# Patient Record
Sex: MALE | Race: WHITE | URBAN - METROPOLITAN AREA
[De-identification: names, ages, dates, MRNs, and addresses within clinical notes are randomized per-mention and may not be internally consistent; named-entity substitution may affect disease eponyms.]

---

## 2017-09-19 ENCOUNTER — HOSPITAL ENCOUNTER (EMERGENCY)
Facility: CLINIC | Age: 57
Discharge: HOME | End: 2017-09-19
Attending: EMERGENCY MEDICINE | Admitting: EMERGENCY MEDICINE
Payer: COMMERCIAL

## 2017-09-19 VITALS
OXYGEN SATURATION: 97 % | TEMPERATURE: 96.3 F | WEIGHT: 230 LBS | DIASTOLIC BLOOD PRESSURE: 77 MMHG | RESPIRATION RATE: 20 BRPM | SYSTOLIC BLOOD PRESSURE: 109 MMHG | HEIGHT: 71 IN | BODY MASS INDEX: 32.2 KG/M2 | HEART RATE: 73 BPM
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
## 2017-09-19 DIAGNOSIS — S91.311A FOOT LACERATION, RIGHT, INITIAL ENCOUNTER: ICD-10-CM

## 2017-09-19 PROCEDURE — 12042 INTMD RPR N-HF/GENIT2.6-7.5: CPT | Performed by: EMERGENCY MEDICINE

## 2017-09-19 PROCEDURE — 99284 EMERGENCY DEPT VISIT MOD MDM: CPT | Mod: Z6 | Performed by: EMERGENCY MEDICINE

## 2017-09-19 PROCEDURE — 25000125 ZZHC RX 250: Performed by: EMERGENCY MEDICINE

## 2017-09-19 PROCEDURE — 99282 EMERGENCY DEPT VISIT SF MDM: CPT | Mod: 25 | Performed by: EMERGENCY MEDICINE

## 2017-09-19 PROCEDURE — 12042 INTMD RPR N-HF/GENIT2.6-7.5: CPT | Mod: Z6 | Performed by: EMERGENCY MEDICINE

## 2017-09-19 PROCEDURE — S0020 INJECTION, BUPIVICAINE HYDRO: HCPCS | Performed by: EMERGENCY MEDICINE

## 2017-09-19 RX ORDER — BUPIVACAINE HYDROCHLORIDE 2.5 MG/ML
1 INJECTION, SOLUTION EPIDURAL; INFILTRATION; INTRACAUDAL ONCE
Status: COMPLETED | OUTPATIENT
Start: 2017-09-19 | End: 2017-09-19

## 2017-09-19 RX ORDER — CEPHALEXIN 500 MG/1
500 CAPSULE ORAL 3 TIMES DAILY
Qty: 12 CAPSULE | Refills: 0 | Status: SHIPPED | OUTPATIENT
Start: 2017-09-19 | End: 2017-09-23

## 2017-09-19 RX ADMIN — BUPIVACAINE HYDROCHLORIDE 2.5 MG: 2.5 INJECTION, SOLUTION EPIDURAL; INFILTRATION; INTRACAUDAL at 17:14

## 2017-09-19 NOTE — ED AVS SNAPSHOT
[facility] Emergency Department    [address] [place] [address]    Phone:  [phone]    Fax:  [phone]                                       Hilton Gonzales   MRN: [id number]    Department:  [facility] Emergency Department   Date of Visit:  9/19/2017           Patient Information     Date Of Birth          1960        Your diagnoses for this visit were:     Foot laceration, right, initial encounter        You were seen by Roni Carbajal MD.      Follow-up Information     Follow up with Quincy Brunner DPM.    Specialty:  Podiatry    Why:  For suture removal, For wound re-check in ~10 days    Contact information:    [facility]   [place] [address] [phone]          Discharge Instructions         Extremity Laceration: Sutures, Staples, or Tape  A laceration is a cut through the skin. If it is deep, it may require stitches (sutures) or staples to close so it can heal. Minor cuts may be treated with surgical tape closures.   X-rays may be done if something may have entered the skin through the cut. You may also need a tetanus shot if you are not up to date on this vaccination.  Home care    Follow the health care provider s instructions on how to care for the cut.    Wash your hands with soap and warm water before and after caring for your wound. This is to help prevent infection.    Keep the wound clean and dry. If a bandage was applied and it becomes wet or dirty, replace it. Otherwise, leave it in place for the first 24 hours, then change it once a day or as directed.    If sutures or staples were used, clean the wound daily:    After removing the bandage, wash the area with soap and water. Use a wet cotton swab to loosen and remove any blood or crust that forms.    After cleaning, keep the wound clean and dry. Talk with your doctor before applying any antibiotic ointment to the wound. Reapply the bandage.    You may remove the bandage to shower as usual after  the first 24 hours, but do not soak the area in water (no swimming) until the stitches or staples are removed.    If surgical tape closures were used, keep the area clean and dry. If it becomes wet, blot it dry with a towel.    The doctor may prescribe an antibiotic cream or ointment to prevent infection. Do not stop taking this medication until you have finished the prescribed course or the doctor tells you to stop. The doctor may also prescribe medications for pain. Follow the doctor s instructions for taking these medications.    Avoid activities that may reopen your wound.  Follow-up care  Follow up with your health care provider. Most skin wounds heal within ten days. However, an infection may sometimes occur despite proper treatment. Therefore, check the wound daily for the signs of infection listed below. Stitches and staples should be removed within 7-14 days. If surgical tape closures were used, you may remove them after 10 days if they have not fallen off by then.   When to seek medical advice  Call your health care provider right away if any of these occur:    Wound bleeding not controlled by direct pressure    Signs of infection, including increasing pain in the wound, increasing wound redness or swelling, or pus or bad odor coming from the wound    Fever of 100.4 F (38 C) or higher or as directed by your healthcare provider    Stitches or staples come apart or fall out or surgical tape falls off before 7 days    Wound edges re-open    Wound changes colors    Numbness around the wound     Decreased movement around the injured area  Date Last Reviewed: 6/14/2015 2000-2017 [employer]. [address] [place] [address], [place] [id number]. All rights reserved. This information is not intended as a substitute for professional medical care. Always follow your healthcare professional's instructions.          24 Hour Appointment Hotline       To make an appointment at any [facility], call  [phone] ([phone]). If you don't have a family doctor or clinic, we will help you find one. [facility] clinics are conveniently located to serve the needs of you and your family.             Review of your medicines      START taking        Dose / Directions Last dose taken    cephALEXin 500 MG capsule   Commonly known as:  KEFLEX   Dose:  500 mg   Quantity:  12 capsule        Take 1 capsule (500 mg) by mouth 3 times daily for 4 days   Refills:  0          Our records show that you are taking the medicines listed below. If these are incorrect, please call your family doctor or clinic.        Dose / Directions Last dose taken    CLONAZEPAM PO   Dose:  0.25 mg        Take 0.25 mg by mouth daily   Refills:  0        SIMVASTATIN PO   Dose:  5 mg        Take 5 mg by mouth At Bedtime   Refills:  0                Prescriptions were sent or printed at these locations (1 Prescription)                   [facility] [place] [id number] [facility]    [address]  [facility] [address]    Telephone:  [phone]   Fax:  [phone]   Hours:                  E-Prescribed (1 of 1)         cephALEXin (KEFLEX) 500 MG capsule                Orders Needing Specimen Collection     None      Pending Results     No orders found from 9/17/2017 to 9/20/2017.            Pending Culture Results     No orders found from 9/17/2017 to 9/20/2017.            Pending Results Instructions     If you had any lab results that were not finalized at the time of your Discharge, you can call the ED Lab Result RN at [phone]. You will be contacted by this team for any positive Lab results or changes in treatment. The nurses are available 7 days a week from 10A to 6:30P.  You can leave a message 24 hours per day and they will return your call.        Thank you for choosing [facility]       Thank you for choosing [facility] for your care. Our goal is always to provide you with excellent care. Hearing back from our patients  "is one way we can continue to improve our services. Please take a few minutes to complete the written survey that you may receive in the mail after you visit with us. Thank you!        "[employer] Information     [employer] [facility] lets you send messages to your doctor, view your test results, renew your prescriptions, schedule appointments and more. To sign up, go to [email or web address] . Click on \"Log in\" on the left side of the screen, which will take you to the Welcome page. Then click on \"Sign up Now\" on the right side of the page.     You will be asked to enter the access code listed below, as well as some personal information. Please follow the directions to create your username and password.     Your access code is: [email or web address]  Expires: 2017  6:36 PM     Your access code will  in 90 days. If you need help or a new code, please call your [facility] clinic or [phone].        Care EveryWhere ID     This is your Care EveryWhere ID. This could be used by other organizations to access your [facility] medical records  [id number]        Equal Access to Services     JOHNNIE FUNG : Hadcatalino Vanegas, farhan maravilla, michaela ryees, masha back . So [facility] [phone].    ATENCIÓN: Si habla español, tiene a castro disposición servicios gratuitos de asistencia lingüística. Llame al [phone].    We comply with applicable federal civil rights laws and [place] laws. We do not discriminate on the basis of race, color, national origin, age, disability sex, sexual orientation or gender identity.            After Visit Summary       This is your record. Keep this with you and show to your community pharmacist(s) and doctor(s) at your next visit.                  "

## 2017-09-19 NOTE — ED AVS SNAPSHOT
[facility] Emergency Department    [address] [place] [address]    Phone:  [phone]    Fax:  [phone]                                       Hilton Gonzales   MRN: [id number]    Department:  [facility] Emergency Department   Date of Visit:  9/19/2017           After Visit Summary Signature Page     I have received my discharge instructions, and my questions have been answered. I have discussed any challenges I see with this plan with the nurse or doctor.    ..........................................................................................................................................  Patient/Patient Representative Signature      ..........................................................................................................................................  Patient Representative Print Name and Relationship to Patient    ..................................................               ................................................  Date                                            Time    ..........................................................................................................................................  Reviewed by Signature/Title    ...................................................              ..............................................  Date                                                            Time

## 2017-09-19 NOTE — DISCHARGE INSTRUCTIONS
Extremity Laceration: Sutures, Staples, or Tape  A laceration is a cut through the skin. If it is deep, it may require stitches (sutures) or staples to close so it can heal. Minor cuts may be treated with surgical tape closures.   X-rays may be done if something may have entered the skin through the cut. You may also need a tetanus shot if you are not up to date on this vaccination.  Home care    Follow the health care provider s instructions on how to care for the cut.    Wash your hands with soap and warm water before and after caring for your wound. This is to help prevent infection.    Keep the wound clean and dry. If a bandage was applied and it becomes wet or dirty, replace it. Otherwise, leave it in place for the first 24 hours, then change it once a day or as directed.    If sutures or staples were used, clean the wound daily:    After removing the bandage, wash the area with soap and water. Use a wet cotton swab to loosen and remove any blood or crust that forms.    After cleaning, keep the wound clean and dry. Talk with your doctor before applying any antibiotic ointment to the wound. Reapply the bandage.    You may remove the bandage to shower as usual after the first 24 hours, but do not soak the area in water (no swimming) until the stitches or staples are removed.    If surgical tape closures were used, keep the area clean and dry. If it becomes wet, blot it dry with a towel.    The doctor may prescribe an antibiotic cream or ointment to prevent infection. Do not stop taking this medication until you have finished the prescribed course or the doctor tells you to stop. The doctor may also prescribe medications for pain. Follow the doctor s instructions for taking these medications.    Avoid activities that may reopen your wound.  Follow-up care  Follow up with your health care provider. Most skin wounds heal within ten days. However, an infection may sometimes occur despite proper treatment.  Therefore, check the wound daily for the signs of infection listed below. Stitches and staples should be removed within 7-14 days. If surgical tape closures were used, you may remove them after 10 days if they have not fallen off by then.   When to seek medical advice  Call your health care provider right away if any of these occur:    Wound bleeding not controlled by direct pressure    Signs of infection, including increasing pain in the wound, increasing wound redness or swelling, or pus or bad odor coming from the wound    Fever of 100.4 F (38 C) or higher or as directed by your healthcare provider    Stitches or staples come apart or fall out or surgical tape falls off before 7 days    Wound edges re-open    Wound changes colors    Numbness around the wound     Decreased movement around the injured area  Date Last Reviewed: 6/14/2015 2000-2017 [employer]. [address] [place] [address]. All rights reserved. This information is not intended as a substitute for professional medical care. Always follow your healthcare professional's instructions.

## 2017-09-20 NOTE — ED PROVIDER NOTES
"  History     Chief Complaint   Patient presents with     Foot Injury     HPI  Hilton Gonzales is a 57 year old male who presents with a foot laceration.  He had the tongue from a plow fall on the medial aspect of his right foot.  He suffered a large laceration.  Pain is minimal.  There was considerable bleeding initially.  Last tetanus was a year ago.  No other injury.    I have reviewed the Medications, Allergies, Past Medical and Surgical History, and Social History in the Epic system.    Allergies:   Allergies   Allergen Reactions     Penicillins          No current facility-administered medications on file prior to encounter.   No current outpatient prescriptions on file prior to encounter.    There is no problem list on file for this patient.      Past Surgical History:   Procedure Laterality Date     EYE SURGERY       ORTHOPEDIC SURGERY         Social History   Substance Use Topics     Smoking status: Never Smoker     Smokeless tobacco: Never Used     Alcohol use No         There is no immunization history on file for this patient.    BMI: Estimated body mass index is 32.08 kg/(m^2) as calculated from the following:    Height as of this encounter: 1.803 m (5' 11\").    Weight as of this encounter: 104.3 kg (230 lb).      Review of Systems  All other systems are reviewed and are negative    Physical Exam   BP: 117/81  Pulse: 90  Temp: 98.4  F (36.9  C)  Resp: 18  Height: 180.3 cm (5' 11\")  Weight: 104.3 kg (230 lb)  SpO2: 97 %  Physical Exam   Constitutional: He is oriented to person, place, and time. He appears well-developed and well-nourished. No distress.   HENT:   Head: Normocephalic and atraumatic.   Eyes: No scleral icterus.   Neck: Normal range of motion. Neck supple.   Musculoskeletal:   6 cm irregular deep lac to inner aspect of right foot below medial malleolus.  No tendon exposure seen.  Achilles appears intact.  Distal CMS intact.     Neurological: He is alert and oriented to person, place, and time. "   Skin: Skin is warm and dry. No rash noted. He is not diaphoretic. No erythema. No pallor.       ED Course     ED Course     Laceration repair  Date/Time: 9/19/2017 7:03 PM  Performed by: DEBORA GRANT  Authorized by: DEBORA GRANT   Body area: lower extremity  Location details: right foot  Laceration length: 6 cm  Foreign bodies: no foreign bodies  Tendon involvement: none  Nerve involvement: none  Vascular damage: no  Anesthesia: local infiltration    Anesthesia:  Local Anesthetic: lidocaine 1% with epinephrine  Anesthetic total: 10 mL  Irrigation solution: saline  Irrigation method: syringe  Amount of cleaning: extensive  Skin closure: Ethilon (3-0)  Number of sutures: 11  Technique: simple  Approximation: close  Approximation difficulty: simple  Dressing: antibiotic ointment and pressure dressing  Comments: Deep wound to the medial foot.  This is explored.  No obvious tendon exposure.  He appears to have full strength and multidirectional testing.  No foreign body seen.                   Critical Care time:  none               Labs Ordered and Resulted from Time of ED Arrival Up to the Time of Departure from the ED - No data to display    Assessments & Plan (with Medical Decision Making)  57-year-old male with large deep medial foot laceration.  This is explored.  No obvious tendon injury.  This is sutured.  Placed on Keflex.  He is referred to podiatry for a recheck due to depth and size of the wound.  Discussed reasons to return if signs of infection in the interim.       I have reviewed the nursing notes.    I have reviewed the findings, diagnosis, plan and need for follow up with the patient.       Discharge Medication List as of 9/19/2017  6:36 PM      START taking these medications    Details   cephALEXin (KEFLEX) 500 MG capsule Take 1 capsule (500 mg) by mouth 3 times daily for 4 days, Disp-12 capsule, R-0, E-Prescribe             Final diagnoses:   Foot laceration, right, initial encounter        9/19/2017   [facility] EMERGENCY DEPARTMENT       Roni Carbajal MD  09/19/17 [id number]

## 2017-09-28 ENCOUNTER — OFFICE VISIT (OUTPATIENT)
Dept: PODIATRY | Facility: CLINIC | Age: 57
End: 2017-09-28
Payer: COMMERCIAL

## 2017-09-28 VITALS — BODY MASS INDEX: 32.2 KG/M2 | WEIGHT: 230 LBS | TEMPERATURE: 97.6 F | HEIGHT: 71 IN

## 2017-09-28 DIAGNOSIS — S91.011A LACERATION OF ANKLE, RIGHT, INITIAL ENCOUNTER: Primary | ICD-10-CM

## 2017-09-28 PROCEDURE — 99243 OFF/OP CNSLTJ NEW/EST LOW 30: CPT | Performed by: PODIATRIST

## 2017-09-28 RX ORDER — CEPHALEXIN 500 MG/1
500 CAPSULE ORAL 4 TIMES DAILY
Qty: 40 CAPSULE | Refills: 0 | Status: SHIPPED | OUTPATIENT
Start: 2017-09-28 | End: 2017-10-25

## 2017-09-28 ASSESSMENT — PAIN SCALES - GENERAL: PAINLEVEL: NO PAIN (0)

## 2017-09-28 NOTE — NURSING NOTE
"Chief Complaint   Patient presents with     Consult     Right heel laceration, DOI 9/19/2017; new pt       Initial Temp 97.6  F (36.4  C) (Temporal)  Ht 5' 11\" (1.803 m)  Wt 230 lb (104.3 kg)  BMI 32.08 kg/m2 Estimated body mass index is 32.08 kg/(m^2) as calculated from the following:    Height as of this encounter: 5' 11\" (1.803 m).    Weight as of this encounter: 230 lb (104.3 kg).  BP completed using cuff size: NA (Not Taken)  Medication Reconciliation: complete    Mary Almonte CMA, September 28, 2017    "

## 2017-09-28 NOTE — NURSING NOTE
Dispensed 1 Pneumatic Walking Brace, Size Medium, with [facility] agreement signed by patient. Mary Almonte CMA, September 28, 2017

## 2017-09-28 NOTE — PROGRESS NOTES
"HPI:  Hilton Gonzales is a 57 year old male who is seen in consultation at the request of Roni Carbajal MD.    Pt presents for eval of:   (Onset, Location, L/R, Character, Treatments, Injury if yes)     9/19/2017 tongue from a 3 point hitch on a 5 board mold board plow fell on Right foot. 11 sutures in ED on 9/19/2017. While lying has intermittent sensation that someone is stabbing his foot. [place].    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 He has been walking on this and he feels that is worsening its.    Weight management plan: Patient was referred to their PCP to discuss a diet and exercise plan.     ROS:  10 point ROS neg other than the symptoms noted above in the HPI.    PAST MEDICAL HISTORY:   Past Medical History:   Diagnosis Date     Arthritis         PAST SURGICAL HISTORY:   Past Surgical History:   Procedure Laterality Date     EYE SURGERY       ORTHOPEDIC SURGERY          MEDICATIONS:   Current Outpatient Prescriptions:      cephALEXin (KEFLEX) 500 MG capsule, Take 1 capsule (500 mg) by mouth 4 times daily, Disp: 40 capsule, Rfl: 0     CLONAZEPAM PO, Take 0.25 mg by mouth daily, Disp: , Rfl:      SIMVASTATIN PO, Take 5 mg by mouth At Bedtime, Disp: , Rfl:      ALLERGIES:    Allergies   Allergen Reactions     Penicillins         SOCIAL HISTORY:   Social History     Social History     Marital status:      Spouse name: N/A     Number of children: N/A     Years of education: N/A     Occupational History     Not on file.     Social History Main Topics     Smoking status: Never Smoker     Smokeless tobacco: Never Used     Alcohol use No     Drug use: No     Sexual activity: Not on file     Other Topics Concern     Not on file     Social History Narrative        FAMILY HISTORY: History reviewed. No pertinent family history.     EXAM:Vitals: Temp 97.6  F (36.4  C) (Temporal)  Ht 5' 11\" " (1.803 m)  Wt 230 lb (104.3 kg)  BMI 32.08 kg/m2  BMI= Body mass index is 32.08 kg/(m^2).    General appearance: Patient is alert and fully cooperative with history & exam.  No sign of distress is noted during the visit.     Psychiatric: Affect is pleasant & appropriate.  Patient appears motivated to improve health.     Respiratory: Breathing is regular & unlabored while sitting.     HEENT: Hearing is intact to spoken word.  Speech is clear.  No gross evidence of visual impairment that would impact ambulation.     Vascular: DP & PT pulses are intact & regular bilaterally.  No significant edema or varicosities noted.  CFT and skin temperature is normal to both lower extremities.     Neurologic: Lower extremity sensation is intact to light touch.  No evidence of weakness or contracture in the lower extremities.  No evidence of neuropathy.    Dermatologic:  Adequate texture turgor tone about the integument. There is a proximally 5 cm of localized erythema mostly about the proximal tarsal tunnel and less than this about distal laceration. There is a 6 cm laceration transverse to the tarsal tunnel about the medial calcaneus right ankle. Each end of the laceration has a proximally 1 cm dry eschar over the central 70% of the laceration remains wet moist well approximated and sutures intact but still very wet with a scant amount of serous bloody drainage. No palpable fluctuance or abscess.    Musculoskeletal: Patient is ambulatory without assistive device or brace.  No gross ankle deformity noted.  No foot or ankle joint effusion is noted.      Radiographs: Deferred     ASSESSMENT:       ICD-10-CM    1. Laceration of ankle, right, initial encounter S91.011A cephALEXin (KEFLEX) 500 MG capsule        PLAN:  Reviewed patient's chart in [facility].      9/28/2017   Patient is very active with weightbearing and the wound edges remain wet with serous drainage and 5 cm of erythema. This does not appear to be clinically infected as  there is no abscess or fluctuance or palpable hematoma or heat however it is quite irritated. Therefore he was placed in a fracture boot. The goal of the fracture boot is not to reduce pressure against the incision but reduce tension as when he walks there is motion or movement along the wound edges.    Also refilled Keflex secondary to the erythema.     Follow-up in one week for suture removal. The sutures are intact and do approximate the wound but the wound is not ready for sutures to be removed. The boot and antibiotics as well as a compression dressing will likely accelerate this. He can continue normal bathing but no soaking. No antibiotic ointment. Dry sterile nonadherent dressing once daily.    Quincy Brunner DPM

## 2017-09-28 NOTE — LETTER
9/28/2017         RE: Hilton Gonzales  [address] [address] [place] [id number]        Dear Colleague,    Thank you for referring your patient, Hilton Gonzales, to the [facility]. Please see a copy of my visit note below.    HPI:  Hilton Gonzales is a 57 year old male who is seen in consultation at the request of Roni Carbajal MD.    Pt presents for eval of:   (Onset, Location, L/R, Character, Treatments, Injury if yes)     9/19/2017 tongue from a 3 point hitch on a 5 board mold board plow fell on Right foot. 11 sutures in ED on 9/19/2017. While lying has intermittent sensation that someone is stabbing his foot. [place]    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 He has been walking on this and he feels that is worsening its.    Weight management plan: Patient was referred to their PCP to discuss a diet and exercise plan.     ROS:  10 point ROS neg other than the symptoms noted above in the HPI.    PAST MEDICAL HISTORY:   Past Medical History:   Diagnosis Date     Arthritis         PAST SURGICAL HISTORY:   Past Surgical History:   Procedure Laterality Date     EYE SURGERY       ORTHOPEDIC SURGERY          MEDICATIONS:   Current Outpatient Prescriptions:      cephALEXin (KEFLEX) 500 MG capsule, Take 1 capsule (500 mg) by mouth 4 times daily, Disp: 40 capsule, Rfl: 0     CLONAZEPAM PO, Take 0.25 mg by mouth daily, Disp: , Rfl:      SIMVASTATIN PO, Take 5 mg by mouth At Bedtime, Disp: , Rfl:      ALLERGIES:    Allergies   Allergen Reactions     Penicillins         SOCIAL HISTORY:   Social History     Social History     Marital status:      Spouse name: N/A     Number of children: N/A     Years of education: N/A     Occupational History     Not on file.     Social History Main Topics     Smoking status: Never Smoker     Smokeless tobacco: Never Used     Alcohol use No     Drug use: No      "Sexual activity: Not on file     Other Topics Concern     Not on file     Social History Narrative        FAMILY HISTORY: History reviewed. No pertinent family history.     EXAM:Vitals: Temp 97.6  F (36.4  C) (Temporal)  Ht 5' 11\" (1.803 m)  Wt 230 lb (104.3 kg)  BMI 32.08 kg/m2  BMI= Body mass index is 32.08 kg/(m^2).    General appearance: Patient is alert and fully cooperative with history & exam.  No sign of distress is noted during the visit.     Psychiatric: Affect is pleasant & appropriate.  Patient appears motivated to improve health.     Respiratory: Breathing is regular & unlabored while sitting.     HEENT: Hearing is intact to spoken word.  Speech is clear.  No gross evidence of visual impairment that would impact ambulation.     Vascular: DP & PT pulses are intact & regular bilaterally.  No significant edema or varicosities noted.  CFT and skin temperature is normal to both lower extremities.     Neurologic: Lower extremity sensation is intact to light touch.  No evidence of weakness or contracture in the lower extremities.  No evidence of neuropathy.    Dermatologic:  Adequate texture turgor tone about the integument. There is a proximally 5 cm of localized erythema mostly about the proximal tarsal tunnel and less than this about distal laceration. There is a 6 cm laceration transverse to the tarsal tunnel about the medial calcaneus right ankle. Each end of the laceration has a proximally 1 cm dry eschar over the central 70% of the laceration remains wet moist well approximated and sutures intact but still very wet with a scant amount of serous bloody drainage. No palpable fluctuance or abscess.    Musculoskeletal: Patient is ambulatory without assistive device or brace.  No gross ankle deformity noted.  No foot or ankle joint effusion is noted.      Radiographs: Deferred     ASSESSMENT:       ICD-10-CM    1. Laceration of ankle, right, initial encounter S91.011A cephALEXin (KEFLEX) 500 MG capsule "        PLAN:  Reviewed patient's chart in [facility].      9/28/2017   Patient is very active with weightbearing and the wound edges remain wet with serous drainage and 5 cm of erythema. This does not appear to be clinically infected as there is no abscess or fluctuance or palpable hematoma or heat however it is quite irritated. Therefore he was placed in a fracture boot. The goal of the fracture boot is not to reduce pressure against the incision but reduce tension as when he walks there is motion or movement along the wound edges.    Also refilled Keflex secondary to the erythema.     Follow-up in one week for suture removal. The sutures are intact and do approximate the wound but the wound is not ready for sutures to be removed. The boot and antibiotics as well as a compression dressing will likely accelerate this. He can continue normal bathing but no soaking. No antibiotic ointment. Dry sterile nonadherent dressing once daily.    Quincy Brunenr DPM      Again, thank you for allowing me to participate in the care of your patient.        Sincerely,        Quincy Brunner DPM

## 2017-09-28 NOTE — MR AVS SNAPSHOT
"              After Visit Summary   9/28/2017    Hilton Gonzales    MRN: [id number]           Patient Information     Date Of Birth          1960        Visit Information        Provider Department      9/28/2017 1:15 PM Quincy Brunner DPM [facility]        Today's Diagnoses     Laceration of ankle, right, initial encounter    -  1       Follow-ups after your visit        Your next 10 appointments already scheduled     Oct 04, 2017  9:30 AM CDT   [facility]    [address] [facility] [id number] [phone]              Who to contact     If you have questions or need follow up information about today's clinic visit or your schedule please contact [facility] directly at [phone].  Normal or non-critical lab and imaging results will be communicated to you by Homuorkhart, letter or phone within 4 business days after the clinic has received the results. If you do not hear from us within 7 days, please contact the clinic through MyChart or phone. If you have a critical or abnormal lab result, we will notify you by phone as soon as possible.  Submit refill requests through [employer] or call your pharmacy and they will forward the refill request to us. Please allow 3 business days for your refill to be completed.          Additional Information About Your Visit        MyChart Information     [employer] lets you send messages to your doctor, view your test results, renew your prescriptions, schedule appointments and more. To sign up, go to [email or web address] . Click on \"Log in\" on the left side of the screen, which will take you to the Welcome page. Then click on \"Sign up Now\" on the right side of the page.     You will be asked to enter the access code listed below, as well as some personal information. Please follow the directions to create your username and password.     Your access code is: " "[email or web address]  Expires: 2017  6:36 PM     Your access code will  in 90 days. If you need help or a new code, please call your [facility] clinic or [phone].        Care EveryWhere ID     This is your Care EveryWhere ID. This could be used by other organizations to access your [facility] medical records  [id number]        Your Vitals Were     Temperature Height BMI (Body Mass Index)             97.6  F (36.4  C) (Temporal) 5' 11\" (1.803 m) 32.08 kg/m2          Blood Pressure from Last 3 Encounters:   17 109/77    Weight from Last 3 Encounters:   17 230 lb (104.3 kg)   17 230 lb (104.3 kg)              Today, you had the following     No orders found for display         Today's Medication Changes          These changes are accurate as of: 17  2:19 PM.  If you have any questions, ask your nurse or doctor.               Start taking these medicines.        Dose/Directions    cephALEXin 500 MG capsule   Commonly known as:  KEFLEX   Used for:  Laceration of ankle, right, initial encounter   Started by:  Quincy Brunner DPM        Dose:  500 mg   Take 1 capsule (500 mg) by mouth 4 times daily   Quantity:  40 capsule   Refills:  0            Where to get your medicines      These medications were sent to [facility] [place] [id number] [facility]   [address] [facility] , [facility] [address]     Phone:  [phone]     cephALEXin 500 MG capsule                Primary Care Provider Office Phone # Fax #    [facility] [phone]       [address] [place] [address]        Equal Access to Services     [facility]: Hadii kirsten ku hadasho Soomaali, waaxda luqadaha, qaybta kaalmada eric, masha farrar. So [facility] [phone].    ATENCIÓN: Si habla español, tiene a castro disposición servicios gratuitos de asistencia lingüística. Llame al [phone].    We comply with applicable federal civil rights laws and " [place] laws. We do not discriminate on the basis of race, color, national origin, age, disability sex, sexual orientation or gender identity.            Thank you!     Thank you for choosing [facility]  for your care. Our goal is always to provide you with excellent care. Hearing back from our patients is one way we can continue to improve our services. Please take a few minutes to complete the written survey that you may receive in the mail after your visit with us. Thank you!             Your Updated Medication List - Protect others around you: Learn how to safely use, store and throw away your medicines at www.disposemymeds.org.          This list is accurate as of: 9/28/17  2:19 PM.  Always use your most recent med list.                   Brand Name Dispense Instructions for use Diagnosis    cephALEXin 500 MG capsule    KEFLEX    40 capsule    Take 1 capsule (500 mg) by mouth 4 times daily    Laceration of ankle, right, initial encounter       CLONAZEPAM PO      Take 0.25 mg by mouth daily        SIMVASTATIN PO      Take 5 mg by mouth At Bedtime

## 2017-10-04 ENCOUNTER — OFFICE VISIT (OUTPATIENT)
Dept: PODIATRY | Facility: OTHER | Age: 57
End: 2017-10-04
Payer: COMMERCIAL

## 2017-10-04 VITALS — WEIGHT: 230 LBS | HEIGHT: 71 IN | TEMPERATURE: 97.7 F | BODY MASS INDEX: 32.2 KG/M2

## 2017-10-04 DIAGNOSIS — S91.011A LACERATION OF ANKLE, RIGHT, INITIAL ENCOUNTER: Primary | ICD-10-CM

## 2017-10-04 PROCEDURE — 99213 OFFICE O/P EST LOW 20 MIN: CPT | Performed by: PODIATRIST

## 2017-10-04 ASSESSMENT — PAIN SCALES - GENERAL: PAINLEVEL: NO PAIN (0)

## 2017-10-04 NOTE — PROGRESS NOTES
"Chief Complaint   Patient presents with     WOUND CARE     (2 weeks, 1 day) WB w/tall gray fx boot, intermitternt sharp pain - Right heel laceration, DOI 9/19/2017; LOV 9/28/2017       Weight management plan: Patient was referred to their PCP to discuss a diet and exercise plan.     HPI:  Hilton Gonzales is a 57 year old male who is seen in consultation at the request of Roni Carbajal MD.    Pt presents for eval of:   (Onset, Location, L/R, Character, Treatments, Injury if yes)     9/19/2017 tongue from a 3 point hitch on a 5 board mold board plow fell on Right foot. 11 sutures in ED on 9/19/2017. While lying has intermittent sensation that someone is stabbing his foot. [place] parents [place].    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 He has been walking on this and he feels that is worsening its.    EXAM:Vitals: Temp 97.7  F (36.5  C) (Temporal)  Ht 5' 11\" (1.803 m)  Wt 230 lb (104.3 kg)  BMI 32.08 kg/m2  BMI= Body mass index is 32.08 kg/(m^2).    General appearance: Patient is alert and fully cooperative with history & exam.  No sign of distress is noted during the visit.     Psychiatric: Affect is pleasant & appropriate.  Patient appears motivated to improve health.     Respiratory: Breathing is regular & unlabored while sitting.     HEENT: Hearing is intact to spoken word.  Speech is clear.  No gross evidence of visual impairment that would impact ambulation.     Vascular: DP & PT pulses are intact & regular bilaterally.  No significant edema or varicosities noted.  CFT and skin temperature is normal to both lower extremities.     Neurologic: Lower extremity sensation is intact to light touch.  No evidence of weakness or contracture in the lower extremities.  No evidence of neuropathy.    Dermatologic:  Adequate texture turgor tone about the integument. There is a laceration about the medial calcaneus " ankle of the right foot. Further description noted below    Musculoskeletal: Patient is ambulatory without assistive device or brace.  No gross ankle deformity noted.  No foot or ankle joint effusion is noted.      Radiographs: Deferred     ASSESSMENT:       ICD-10-CM    1. Laceration of ankle, right, initial encounter S91.011A         PLAN:  Reviewed patient's chart in [facility].      9/28/2017   Patient is very active with weightbearing and the wound edges remain wet with serous drainage and 5 cm of erythema. This does not appear to be clinically infected as there is no abscess or fluctuance or palpable hematoma or heat however it is quite irritated. Therefore he was placed in a fracture boot. The goal of the fracture boot is not to reduce pressure against the incision but reduce tension as when he walks there is motion or movement along the wound edges.    Also refilled Keflex secondary to the erythema.     Follow-up in one week for suture removal. The sutures are intact and do approximate the wound but the wound is not ready for sutures to be removed. The boot and antibiotics as well as a compression dressing will likely accelerate this. He can continue normal bathing but no soaking. No antibiotic ointment. Dry sterile nonadherent dressing once daily.    10/4/2017  Patient has been walking quite a bit without the fracture boot. 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 The central 50% of the laceration is still quite wet and there is about 3 cm of localized erythema and he remains on the antibiotic. I think he is being a bit too active.  Instructed to remain minimally weightbearing and only weightbearing in the fracture boot. Normal bathing. No soaking or submersion. No antibiotic ointments. Compression dressing. Follow-up one week for suture removal. Continue oral antibiotics until gone. He has several days left.    Quincy Brunner DPM

## 2017-10-04 NOTE — NURSING NOTE
"Chief Complaint   Patient presents with     WOUND CARE     (2 weeks, 1 day) WB w/tall gray fx boot, intermitternt sharp pain - Right heel laceration, DOI 9/19/2017; LOV 9/28/2017       Initial Temp 97.7  F (36.5  C) (Temporal)  Ht 5' 11\" (1.803 m)  Wt 230 lb (104.3 kg)  BMI 32.08 kg/m2 Estimated body mass index is 32.08 kg/(m^2) as calculated from the following:    Height as of this encounter: 5' 11\" (1.803 m).    Weight as of this encounter: 230 lb (104.3 kg).  BP completed using cuff size: NA (Not Taken)  Medication Reconciliation: complete    Mary Almonte CMA, October 4, 2017    "

## 2017-10-04 NOTE — MR AVS SNAPSHOT
"              After Visit Summary   10/4/2017    Hilton Gonzales    MRN: [id number]           Patient Information     Date Of Birth          1960        Visit Information        Provider Department      10/4/2017 9:30 AM Quincy Brunner DPM [facility]        Today's Diagnoses     Laceration of ankle, right, initial encounter    -  1      Care Instructions    Remain in the fracture boot for all weightbearing.          Follow-ups after your visit        Who to contact     If you have questions or need follow up information about today's clinic visit or your schedule please contact [facility] directly at [phone].  Normal or non-critical lab and imaging results will be communicated to you by [employer], letter or phone within 4 business days after the clinic has received the results. If you do not hear from us within 7 days, please contact the clinic through [employer] or phone. If you have a critical or abnormal lab result, we will notify you by phone as soon as possible.  Submit refill requests through [employer] or call your pharmacy and they will forward the refill request to us. Please allow 3 business days for your refill to be completed.          Additional Information About Your Visit        MyChart Information     [employer] lets you send messages to your doctor, view your test results, renew your prescriptions, schedule appointments and more. To sign up, go to [email or web address] . Click on \"Log in\" on the left side of the screen, which will take you to the Welcome page. Then click on \"Sign up Now\" on the right side of the page.     You will be asked to enter the access code listed below, as well as some personal information. Please follow the directions to create your username and password.     Your access code is: [email or web address]  Expires: 2017  6:36 PM     Your access code will  in 90 days. If you need help or a new code, please call your [facility] or " "[phone].        Care EveryWhere ID     This is your Care EveryWhere ID. This could be used by other organizations to access your [facility] medical records  [id number]        Your Vitals Were     Temperature Height BMI (Body Mass Index)             97.7  F (36.5  C) (Temporal) 5' 11\" (1.803 m) 32.08 kg/m2          Blood Pressure from Last 3 Encounters:   09/19/17 109/77    Weight from Last 3 Encounters:   10/04/17 230 lb (104.3 kg)   09/28/17 230 lb (104.3 kg)   09/19/17 230 lb (104.3 kg)              Today, you had the following     No orders found for display       Primary Care Provider Office Phone # Fax #    [facility] [phone]       [address] [place], [place] [id number]        Equal Access to Services     JOHNNIE FUNG : Hadii kirsten riggins Sobrittany, waaxda luqtrev, qaybta kaalmada eric, masha back . So [facility] [phone].    ATENCIÓN: Si habla español, tiene a castro disposición servicios gratuitos de asistencia lingüística. Willard al [phone].    We comply with applicable federal civil rights laws and [place] laws. We do not discriminate on the basis of race, color, national origin, age, disability, sex, sexual orientation, or gender identity.            Thank you!     Thank you for choosing [facility]  for your care. Our goal is always to provide you with excellent care. Hearing back from our patients is one way we can continue to improve our services. Please take a few minutes to complete the written survey that you may receive in the mail after your visit with us. Thank you!             Your Updated Medication List - Protect others around you: Learn how to safely use, store and throw away your medicines at www.disposemymeds.org.          This list is accurate as of: 10/4/17 10:01 AM.  Always use your most recent med list.                   Brand Name Dispense Instructions for use Diagnosis    cephALEXin 500 MG capsule "    KEFLEX    40 capsule    Take 1 capsule (500 mg) by mouth 4 times daily    Laceration of ankle, right, initial encounter       CLONAZEPAM PO      Take 0.25 mg by mouth daily        SIMVASTATIN PO      Take 5 mg by mouth At Bedtime

## 2017-10-11 ENCOUNTER — OFFICE VISIT (OUTPATIENT)
Dept: PODIATRY | Facility: OTHER | Age: 57
End: 2017-10-11
Payer: COMMERCIAL

## 2017-10-11 VITALS — WEIGHT: 235 LBS | TEMPERATURE: 97.8 F | HEIGHT: 71 IN | BODY MASS INDEX: 32.9 KG/M2

## 2017-10-11 DIAGNOSIS — S91.011A LACERATION OF ANKLE, RIGHT, INITIAL ENCOUNTER: Primary | ICD-10-CM

## 2017-10-11 PROCEDURE — 99213 OFFICE O/P EST LOW 20 MIN: CPT | Performed by: PODIATRIST

## 2017-10-11 ASSESSMENT — PAIN SCALES - GENERAL: PAINLEVEL: NO PAIN (0)

## 2017-10-11 NOTE — PATIENT INSTRUCTIONS
Reduced activity and postoperative shoe ×1 week. Follow-up in 2 or 3 weeks with any late healing at that time.

## 2017-10-11 NOTE — MR AVS SNAPSHOT
"              After Visit Summary   10/11/2017    Hilton Gonzales    MRN: [id number]           Patient Information     Date Of Birth          1960        Visit Information        Provider Department      10/11/2017 9:45 AM Quincy Brunner DPM [facility]        Today's Diagnoses     Laceration of ankle, right, initial encounter    -  1      Care Instructions    Reduced activity and postoperative shoe ×1 week. Follow-up in 2 or 3 weeks with any late healing at that time.          Follow-ups after your visit        Who to contact     If you have questions or need follow up information about today's clinic visit or your schedule please contact [facility] directly at [phone].  Normal or non-critical lab and imaging results will be communicated to you by [employer], letter or phone within 4 business days after the clinic has received the results. If you do not hear from us within 7 days, please contact the clinic through [employer] or phone. If you have a critical or abnormal lab result, we will notify you by phone as soon as possible.  Submit refill requests through [employer] or call your pharmacy and they will forward the refill request to us. Please allow 3 business days for your refill to be completed.          Additional Information About Your Visit        MyChart Information     [employer] lets you send messages to your doctor, view your test results, renew your prescriptions, schedule appointments and more. To sign up, go to [email or web address] . Click on \"Log in\" on the left side of the screen, which will take you to the Welcome page. Then click on \"Sign up Now\" on the right side of the page.     You will be asked to enter the access code listed below, as well as some personal information. Please follow the directions to create your username and password.     Your access code is: [email or web address]  Expires: 2017  6:36 PM     Your access code will  in 90 days. If you need help or " "a new code, please call your [facility] clinic or [phone].        Care EveryWhere ID     This is your Care EveryWhere ID. This could be used by other organizations to access your [facility] medical records  [id number]        Your Vitals Were     Temperature Height BMI (Body Mass Index)             97.8  F (36.6  C) (Temporal) 5' 11\" (1.803 m) 32.78 kg/m2          Blood Pressure from Last 3 Encounters:   09/19/17 109/77    Weight from Last 3 Encounters:   10/11/17 235 lb (106.6 kg)   10/04/17 230 lb (104.3 kg)   09/28/17 230 lb (104.3 kg)              Today, you had the following     No orders found for display       Primary Care Provider Office Phone # Fax #    [facility] [phone]       [facility] [place] [id number]        Equal Access to Services     JOHNNIE FUNG : Hadii kirsten Vanegas, waaxda luqadaha, qaybta kaalmada adeandrewyada, masha back . So [facility] [phone].    ATENCIÓN: Si habla español, tiene a castro disposición servicios gratuitos de asistencia lingüística. Willard al [phone].    We comply with applicable federal civil rights laws and [place] laws. We do not discriminate on the basis of race, color, national origin, age, disability, sex, sexual orientation, or gender identity.            Thank you!     Thank you for choosing [facility]  for your care. Our goal is always to provide you with excellent care. Hearing back from our patients is one way we can continue to improve our services. Please take a few minutes to complete the written survey that you may receive in the mail after your visit with us. Thank you!             Your Updated Medication List - Protect others around you: Learn how to safely use, store and throw away your medicines at www.disposemymeds.org.          This list is accurate as of: 10/11/17 10:06 AM.  Always use your most recent med list.                   Brand Name Dispense Instructions " for use Diagnosis    cephALEXin 500 MG capsule    KEFLEX    40 capsule    Take 1 capsule (500 mg) by mouth 4 times daily    Laceration of ankle, right, initial encounter       CLONAZEPAM PO      Take 0.25 mg by mouth daily        SIMVASTATIN PO      Take 5 mg by mouth At Bedtime

## 2017-10-11 NOTE — PROGRESS NOTES
"Chief Complaint   Patient presents with     RECHECK     (3 weeks, 1 day) WB w/tall gray fx boot when active, presents today w/casual shoes - Right heel laceration, DOI 9/19/2017; LOV 10/4/2017       Weight management plan: Patient was referred to their PCP to discuss a diet and exercise plan.     HPI:  Hilton Gonzales is a 57 year old male who is seen in consultation at the request of Roni Carbajal MD.    Pt presents for eval of:   (Onset, Location, L/R, Character, Treatments, Injury if yes)     9/19/2017 tongue from a 3 point hitch on a 5 board mold board plow fell on Right foot. 11 sutures in ED on 9/19/2017. While lying has intermittent sensation that someone is stabbing his foot. [place].    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 He has been walking on this and he feels that is worsening its.    EXAM:Vitals: Temp 97.8  F (36.6  C) (Temporal)  Ht 5' 11\" (1.803 m)  Wt 235 lb (106.6 kg)  BMI 32.78 kg/m2  BMI= Body mass index is 32.78 kg/(m^2).    General appearance: Patient is alert and fully cooperative with history & exam.  No sign of distress is noted during the visit.     Psychiatric: Affect is pleasant & appropriate.  Patient appears motivated to improve health.     Respiratory: Breathing is regular & unlabored while sitting.     HEENT: Hearing is intact to spoken word.  Speech is clear.  No gross evidence of visual impairment that would impact ambulation.     Vascular: DP & PT pulses are intact & regular bilaterally.  No significant edema or varicosities noted.  CFT and skin temperature is normal to both lower extremities.     Neurologic: Lower extremity sensation is intact to light touch.  No evidence of weakness or contracture in the lower extremities.  No evidence of neuropathy.    Dermatologic:  Adequate texture turgor tone about the integument. There is a laceration about the medial " calcaneus ankle of the right foot. There is a 1 cm area of still very scant serous drainage. Very light diffuse erythema surrounding the wound about 1 cm likely consistent with weightbearing activity and tension on the wound.    Musculoskeletal: Patient is ambulatory without assistive device or brace.  No gross ankle deformity noted.  No foot or ankle joint effusion is noted.      Radiographs: Deferred     ASSESSMENT:       ICD-10-CM    1. Laceration of ankle, right, initial encounter S91.011A         PLAN:  Reviewed [facility]'s chart in [facility].      9/28/2017   Patient is very active with weightbearing and the wound edges remain wet with serous drainage and 5 cm of erythema. This does not appear to be clinically infected as there is no abscess or fluctuance or palpable hematoma or heat however it is quite irritated. Therefore he was placed in a fracture boot. The goal of the fracture boot is not to reduce pressure against the incision but reduce tension as when he walks there is motion or movement along the wound edges.    Also refilled Keflex secondary to the erythema.     Follow-up in one week for suture removal. The sutures are intact and do approximate the wound but the wound is not ready for sutures to be removed. The boot and antibiotics as well as a compression dressing will likely accelerate this. He can continue normal bathing but no soaking. No antibiotic ointment. Dry sterile nonadherent dressing once daily.    10/4/2017  Patient has been walking quite a bit without the fracture boot. 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 The central 50% of the laceration is still quite wet and there is about 3 cm of localized erythema and he remains on the antibiotic. I think he is being a bit too active.  Instructed to remain minimally weightbearing and only weightbearing in the fracture boot. Normal bathing. No soaking or submersion. No antibiotic ointments. Compression dressing. Follow-up one week  for suture removal. Continue oral antibiotics until gone. He has several days left.    10/11/2017  All sutures were removed. Gauze compression dressing with roller applied he was instructed to return to his postoperative shoe and apply compression dressing minimal weightbearing activities ×1 more week. He can continue bathing but no soaking or submersion. Follow-up in 2 or 3 weeks if not able to return to regular shoes at that time or any drainage or competitions noted. All questions were answered    Quincy Brunner DPM

## 2017-10-11 NOTE — NURSING NOTE
"Chief Complaint   Patient presents with     RECHECK     (3 weeks, 1 day) WB w/tall gray fx boot when active, presents today w/casual shoes - Right heel laceration, DOI 9/19/2017; LOV 10/4/2017       Initial Temp 97.8  F (36.6  C) (Temporal)  Ht 5' 11\" (1.803 m)  Wt 235 lb (106.6 kg)  BMI 32.78 kg/m2 Estimated body mass index is 32.78 kg/(m^2) as calculated from the following:    Height as of this encounter: 5' 11\" (1.803 m).    Weight as of this encounter: 235 lb (106.6 kg).  BP completed using cuff size: NA (Not Taken)  Medication Reconciliation: complete    Mary Almonte CMA, October 11, 2017    "

## 2017-10-25 ENCOUNTER — OFFICE VISIT (OUTPATIENT)
Dept: PODIATRY | Facility: OTHER | Age: 57
End: 2017-10-25
Payer: COMMERCIAL

## 2017-10-25 VITALS — TEMPERATURE: 97.5 F | HEIGHT: 71 IN | WEIGHT: 237 LBS | BODY MASS INDEX: 33.18 KG/M2

## 2017-10-25 DIAGNOSIS — S91.011A LACERATION OF ANKLE, RIGHT, INITIAL ENCOUNTER: Primary | ICD-10-CM

## 2017-10-25 PROCEDURE — 99213 OFFICE O/P EST LOW 20 MIN: CPT | Performed by: PODIATRIST

## 2017-10-25 RX ORDER — HYDROPHILIC CREAM
PASTE (GRAM) TOPICAL
Qty: 1 TUBE | Refills: 1 | Status: SHIPPED | OUTPATIENT
Start: 2017-10-25

## 2017-10-25 ASSESSMENT — PAIN SCALES - GENERAL: PAINLEVEL: MILD PAIN (2)

## 2017-10-25 NOTE — PROGRESS NOTES
"Chief Complaint   Patient presents with     RECHECK     (5 weeks, 1 day) WB w/casual shoes, swelling, redness @ incision - Right heel laceration, DOI 9/19/2017; LOV 10/11/2017       Weight management plan: Patient was referred to their PCP to discuss a diet and exercise plan.     HPI:  Hilton Gonzales is a 57 year old male who is seen in consultation at the request of Roni Carbajal MD.    Pt presents for eval of:   (Onset, Location, L/R, Character, Treatments, Injury if yes)     9/19/2017 tongue from a 3 point hitch on a 5 board mold board plow fell on Right foot. 11 sutures in ED on 9/19/2017. While lying has intermittent sensation that someone is stabbing his foot. This happened [place].    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 He has been walking on this and he feels that is worsening its.    EXAM:Vitals: Temp 97.5  F (36.4  C) (Temporal)  Ht 5' 11\" (1.803 m)  Wt 237 lb (107.5 kg)  BMI 33.05 kg/m2  BMI= Body mass index is 33.05 kg/(m^2).    General appearance: Patient is alert and fully cooperative with history & exam.  No sign of distress is noted during the visit.     Psychiatric: Affect is pleasant & appropriate.  Patient appears motivated to improve health.     Respiratory: Breathing is regular & unlabored while sitting.     HEENT: Hearing is intact to spoken word.  Speech is clear.  No gross evidence of visual impairment that would impact ambulation.     Vascular: DP & PT pulses are intact & regular bilaterally.  No significant edema or varicosities noted.  CFT and skin temperature is normal to both lower extremities.     Neurologic: Lower extremity sensation is intact to light touch.  No evidence of weakness or contracture in the lower extremities.  No evidence of neuropathy.    Dermatologic:  Adequate texture turgor tone about the integument. There is a laceration about the medial calcaneus ankle " of the right foot about 6 cm in length. Mild fibrotic eschar mostly dry throughout the entire lesion. The central 170 portion has a scant amount of moisture and less than 1 cm of erythema. No induration throughout the wound. No heat or pain.    Musculoskeletal: Patient is ambulatory without assistive device or brace.  No gross ankle deformity noted.  No foot or ankle joint effusion is noted.      Radiographs: Deferred     ASSESSMENT:       ICD-10-CM    1. Laceration of ankle, right, initial encounter S91.011A Wound Dressings (TRIAD HYDROPHILIC WOUND DRESSI) PSTE        PLAN:  Reviewed patient's chart in [facility].      9/28/2017   Patient is very active with weightbearing and the wound edges remain wet with serous drainage and 5 cm of erythema. This does not appear to be clinically infected as there is no abscess or fluctuance or palpable hematoma or heat however it is quite irritated. Therefore he was placed in a fracture boot. The goal of the fracture boot is not to reduce pressure against the incision but reduce tension as when he walks there is motion or movement along the wound edges.    Also refilled Keflex secondary to the erythema.     Follow-up in one week for suture removal. The sutures are intact and do approximate the wound but the wound is not ready for sutures to be removed. The boot and antibiotics as well as a compression dressing will likely accelerate this. He can continue normal bathing but no soaking. No antibiotic ointment. Dry sterile nonadherent dressing once daily.    10/4/2017  Patient has been walking quite a bit without the fracture boot. 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 The central 50% of the laceration is still quite wet and there is about 3 cm of localized erythema and he remains on the antibiotic. I think he is being a bit too active.  Instructed to remain minimally weightbearing and only weightbearing in the fracture boot. Normal bathing. No soaking or  submersion. No antibiotic ointments. Compression dressing. Follow-up one week for suture removal. Continue oral antibiotics until gone. He has several days left.    10/11/2017  All sutures were removed. Gauze compression dressing with roller applied he was instructed to return to his postoperative shoe and apply compression dressing minimal weightbearing activities ×1 more week. He can continue bathing but no soaking or submersion. Follow-up in 2 or 3 weeks if not able to return to regular shoes at that time or any drainage or competitions noted. All questions were answered    10/25/2017  The wound is healing quite well with just a scant area of openness. The distal portion of the laceration provides a bit of irritation for him. Minimal bulk is noted about this wound on the medial calcaneus of the right foot and minimal debridement occurred today. Recommended wound gel to soften the wound edges and encourage the wound filling with minimal scarring and this may also ease his discomfort about the distal laceration.  Follow-up with any continued questions concerns or problems and return to normal activities as tolerated.    Quincy Brunner DPM

## 2017-10-25 NOTE — MR AVS SNAPSHOT
"              After Visit Summary   10/25/2017    Hilton Gonzales    MRN: [id number]           Patient Information     Date Of Birth          1960        Visit Information        Provider Department      10/25/2017 7:30 AM Quincy Brunner DPM [facility]        Today's Diagnoses     Laceration of ankle, right, initial encounter    -  1      Care Instructions    Follow-up as needed.          Follow-ups after your visit        Who to contact     If you have questions or need follow up information about today's clinic visit or your schedule please contact [facility] directly at [phone].  Normal or non-critical lab and imaging results will be communicated to you by Newmerixhart, letter or phone within 4 business days after the clinic has received the results. If you do not hear from us within 7 days, please contact the clinic through Newmerixhart or phone. If you have a critical or abnormal lab result, we will notify you by phone as soon as possible.  Submit refill requests through e-Zassi or call your pharmacy and they will forward the refill request to us. Please allow 3 business days for your refill to be completed.          Additional Information About Your Visit        MyChart Information     e-Zassi lets you send messages to your doctor, view your test results, renew your prescriptions, schedule appointments and more. To sign up, go to [email or web address] . Click on \"Log in\" on the left side of the screen, which will take you to the Welcome page. Then click on \"Sign up Now\" on the right side of the page.     You will be asked to enter the access code listed below, as well as some personal information. Please follow the directions to create your username and password.     Your access code is: [email or web address]  Expires: 2017  6:36 PM     Your access code will  in 90 days. If you need help or a new code, please call your [facility] or [phone].        Care EveryWhere ID  " "   This is your Care EveryWhere ID. This could be used by other organizations to access your [facility] medical records  [id number]        Your Vitals Were     Temperature Height BMI (Body Mass Index)             97.5  F (36.4  C) (Temporal) 5' 11\" (1.803 m) 33.05 kg/m2          Blood Pressure from Last 3 Encounters:   09/19/17 109/77    Weight from Last 3 Encounters:   10/25/17 237 lb (107.5 kg)   10/11/17 235 lb (106.6 kg)   10/04/17 230 lb (104.3 kg)              Today, you had the following     No orders found for display         Today's Medication Changes          These changes are accurate as of: 10/25/17  8:27 AM.  If you have any questions, ask your nurse or doctor.               Start taking these medicines.        Dose/Directions    TRIAD HYDROPHILIC WOUND DRESSI Pste   Used for:  Laceration of ankle, right, initial encounter   Started by:  Quincy Brunner DPM        Apply a small amount to the wound or dressing then apply dressing/covering to keep in place and absorb moisture.   Quantity:  1 Tube   Refills:  1            Where to get your medicines      These medications were sent to [facility] [place] [address]   [address] [facility] [address]     Phone:  [phone]     TRIAD HYDROPHILIC WOUND DRESSI Pste                Primary Care Provider Office Phone # Fax #    [facility] [phone]       [address] [place] [address]        Equal Access to Services     JOHNNIE FUNG AH: Fatimah Vanegas, waaxda lugiseladaha, qaybta kaalmada eric, masha farrar. So [facility] [phone].    ATENCIÓN: Si habla español, tiene a castro disposición servicios gratuitos de asistencia lingüística. Llame al [phone].    We comply with applicable federal civil rights laws and [place] laws. We do not discriminate on the basis of race, color, national origin, age, disability, sex, sexual orientation, or gender " identity.            Thank you!     Thank you for choosing [facility]  for your care. Our goal is always to provide you with excellent care. Hearing back from our patients is one way we can continue to improve our services. Please take a few minutes to complete the written survey that you may receive in the mail after your visit with us. Thank you!             Your Updated Medication List - Protect others around you: Learn how to safely use, store and throw away your medicines at www.disposemymeds.org.          This list is accurate as of: 10/25/17  8:27 AM.  Always use your most recent med list.                   Brand Name Dispense Instructions for use Diagnosis    CLONAZEPAM PO      Take 0.25 mg by mouth daily        SIMVASTATIN PO      Take 5 mg by mouth At Bedtime        TRIAD HYDROPHILIC WOUND DRESSI Pste     1 Tube    Apply a small amount to the wound or dressing then apply dressing/covering to keep in place and absorb moisture.    Laceration of ankle, right, initial encounter

## 2017-10-25 NOTE — NURSING NOTE
"Chief Complaint   Patient presents with     RECHECK     (5 weeks, 1 day) WB w/casual shoes, swelling, redness @ incision - Right heel laceration, DOI 9/19/2017; LOV 10/11/2017       Initial Temp 97.5  F (36.4  C) (Temporal)  Ht 5' 11\" (1.803 m)  Wt 237 lb (107.5 kg)  BMI 33.05 kg/m2 Estimated body mass index is 33.05 kg/(m^2) as calculated from the following:    Height as of this encounter: 5' 11\" (1.803 m).    Weight as of this encounter: 237 lb (107.5 kg).  BP completed using cuff size: NA (Not Taken)  Medication Reconciliation: complete    Mary Almonte CMA, October 25, 2017    "